# Patient Record
(demographics unavailable — no encounter records)

---

## 2024-12-19 NOTE — REASON FOR VISIT
[Initial Consultation] : an initial consultation for [Referred By: ___] : Referred By: [unfilled] [Rectal Cancer] : rectal cancer [Spouse] : spouse [FreeTextEntry2] : invasive moderately differentiated rectal adenocarcinoma

## 2024-12-19 NOTE — RESULTS/DATA
[FreeTextEntry1] : ***12/06/2024 - POLYPECTOMY PATHOLOGY*** Specimens: A. Colon, cecum, polyp B. Colon, sigmoid, polyp C. Rectal polyp Diagnoses: A. Colon, cecum, polyp, EMR - Tubular adenoma(S), multiple fragments B. Colon, sigmoid, polyp, EMR - Tubulovillous adenoma, multiple fragments C. Rectal polyp, EMR - Invasive moderately differentiated adenocarcinoma - Multiple fragments of invasive adenocarcinoma arising from a tubular adenoma with high grade dysplasia RESULTS: Mismatch repair: - Immunohistochemistry (IHC) MLH1 result: Intact - Immunohistochemistry (IHC) MLH2 result: Intact - Immunohistochemistry (IHC) MSH6 result: Intact HER2: - Positive (Score 3+)  - Percentage of Tumor Cells with Specific membrane staining: Greater than or equal to 50%   ***12/14/2024 - STAGING CT CAP*** INTERPRETATION:  - CLINICAL INFORMATION: Newly diagnosed colon cancer, staging scan - COMPARISON: None. FINDINGS: - LUNGS AND LARGE AIRWAYS: Patent central airways. No pulmonary nodules. - PLEURA: No pleural effusion. - VESSELS: Within normal limits. - HEART: Heart size is normal. No pericardial effusion. - MEDIASTINUM AND JORGE A: No lymphadenopathy. - CHEST WALL AND LOWER NECK: Within normal limits. - LIVER: Within normal limits. - BILE DUCTS: Normal caliber. - GALLBLADDER: Within normal limits. - SPLEEN: Within normal limits. - PANCREAS: Within normal limits. - ADRENALS: Within normal limits. - KIDNEYS/URETERS: A 1.3 cm ill-defined low-attenuation lesion in the right kidney is likely benign however follow-up recommended to ensure stability. No evidence for hydronephrosis. - BLADDER: Within normal limits. - REPRODUCTIVE ORGANS: Prostate within normal limits. - BOWEL: Stool-filled colon is visible. No discrete colonic lesions visible; small colonic lesions cannot be entirely excluded. No bowel obstruction. Appendix is normal. - PERITONEUM/RETROPERITONEUM: Within normal limits. - VESSELS: Within normal limits. - LYMPH NODES: No lymphadenopathy. - ABDOMINAL WALL: Within normal limits. - BONES: Within normal limits. IMPRESSION: - No evidence for metastatic disease. - A 1.3 cm ill-defined low-attenuation lesion in the right kidney is likely benign however follow-up recommended to ensure stability. --- End of Report ---

## 2024-12-19 NOTE — ASSESSMENT
[FreeTextEntry1] : Mr. Russell is a 62 y/o male with a PMHx of atrial fibrillation, presenting for surgical evaluation of an invasive and moderately differentiated adenocarcinoma of the rectum, pMMR, staging CT C/A/P with no evidence of metastases. Has not yet had rectal MRI.  Clinically, Mr. Russell's physical examination is unremarkable. We had an extensive discussion regarding the patient's newly diagnosed rectal adenocarcinoma, and the staging and treatments involved. I have recommended that he follow up with my colorectal colleague Dr. Angulo for definitive management. I will order his rectal MRI for staging.  PLAN: 1) Follow up with Dr. Angulo 2) Rectal MRI, labs including CEA to complete staging.  Canelo Ely MD  Assistant Professor of Surgery [place] [facility] [employer] [facility] Division of Surgical Oncology [facility] Phone: [phone] Fax: [phone]  I spent 60 minutes reviewing the patient's chart, labs, imaging, interviewing and examining patient, and discussing plan of care with the patient, resident/PA team, and other providers, excluding separately billable procedures and teaching time.

## 2024-12-19 NOTE — PHYSICAL EXAM
[Normal Neck Lymph Nodes] : normal neck lymph nodes  [Normal Supraclavicular Lymph Nodes] : normal supraclavicular lymph nodes [Normal Groin Lymph Nodes] : normal groin lymph nodes [Normal Axillary Lymph Nodes] : normal axillary lymph nodes [Normal] : oriented to person, place and time, with appropriate affect [de-identified] : wears glasses [de-identified] : RRR [de-identified] : easy WOB [de-identified] : Soft, non-tender non-distended.  No rebound, no guarding, no rigidity. No peritoneal signs. No masses.

## 2024-12-19 NOTE — HISTORY OF PRESENT ILLNESS
[de-identified] : Mr. Janiya Russell is a 62 y/o male with a PMHx of shuffling gait, presenting for surgical evaluation of colon cancer. Referred by family friend. Initial work up at [facility].   Patient underwent initial health surveillance colonoscopy in 08/2024, which was impressionable for colonic polyps. Patient was the scheduled for polypectomy for further work up on 10/14/2024, but this was postponed due to incidental atrial fibrillation. Once patient was treated by cardiology for now controlled a-fib, he elected to proceed with rescheduled polypectomy. During the 12/06/2024 polypectomy, 3 large polyps were excised - the 2.6x2.5x0.5cm rectal polyp was consistent with invasive moderately differentiated adenocarcinoma, arising from a tubular adenoma with high grade dysplasia. The height of the lesion on colonoscopy was not reported. Patient was lost to follow up by [facility] system and sought additional evaluations with [facility] and [facility]. Per [facility] cancer navigation, patient obtained staging CT on 12/14/2024, which demonstrated a 1.3 cm ill-defined low-attenuation lesion in the right kidney, no evidence of metastatic disease.   Today, Mr. Russell presents for initial consultation of the newly diagnosed rectal adenocarcinoma. Accompanied by his wife. He is doing well at this time. The patient reports that he has been doing well and had no preexisting medical conditions prior to this diagnosis. Denies any gastrointestinal symptoms prior to colonoscopy, stating he went for colonoscopy per routine health surveillance recommendations by family/PCP. Denies nausea, vomiting, diarrhea, abdominal/rectal pain, melena, or blood in stool/toilet/wiping.   PMHx: atrial fibrillation (Metoprolol)  FMHx: breast cancer (mother); melanoma and stroke (father) SHx: Left shoulder surgery SocHx: employed as ; ; lives in [place]

## 2025-01-03 NOTE — PHYSICAL EXAM
[Exam Deferred] : exam was deferred [Respiratory Effort] : normal respiratory effort [Normal Rate and Rhythm] : normal rate and rhythm [Calm] : calm [de-identified] : Well-appearing, in no distress [de-identified] : Soft, nontender, nondistended.  No mass or hernias appreciated [de-identified] : Normocephalic, atraumatic [de-identified] : Moves extremities without difficulty [de-identified] : Warm and dry [de-identified] : Alert and oriented x 3

## 2025-01-03 NOTE — HISTORY OF PRESENT ILLNESS
[FreeTextEntry1] : 63-year-old male presents for consultation for rectal cancer.  He underwent his first colonoscopy in August and was noted to have 5 polyps, 2 of which were removed.  Recommendation was for an advanced endoscopist to remove the remaining polyps.  This was scheduled but delayed due to new onset A-fib at the time of the colonoscopy.  He ultimately underwent endoscopic mucosal resection on December 6th at [facility] and had a 2.5 cm rectal polyp removed which returned invasive moderately differentiated adenocarcinoma arising from tubular adenoma with high-grade dysplasia.  Based on the colonoscopy report, this polyp was removed entirely.  He underwent staging workup with a CT scan of the chest abdomen pelvis which was negative for metastatic disease.  He underwent MRI of the pelvis and the report is pending. CEA level 0.9.   With regards to his new onset A-fib, he is on metoprolol but no anticoagulation.

## 2025-01-03 NOTE — PLAN
[TextEntry] : 63-year-old male with a diagnosis of rectal cancer based on a polypectomy.  The margins of the polypectomy were not assessed.  Also, the exact location of the tumor in the rectum was not mentioned on the colonoscopy report.  MRI report is pending but I do not see any obvious mucosal lesions on the MRI which is not surprising since this area was removed completely.  I recommended flexible sigmoidoscopy to identify the exact location tumor.  Depending on the final MRI report as well as location within the rectum, would determine the next course of action in terms of surgery, medical and/or radiation treatment.  Patient will be discussed at multidisciplinary rectal cancer tumor board.

## 2025-01-03 NOTE — CONSULT LETTER
[Dear  ___] : Dear  [unfilled], [Consult Letter:] : I had the pleasure of evaluating your patient, [unfilled]. [Please see my note below.] : Please see my note below. [Consult Closing:] : Thank you very much for allowing me to participate in the care of this patient.  If you have any questions, please do not hesitate to contact me. [Sincerely,] : Sincerely, [FreeTextEntry3] : Zachariah Pedraza MD

## 2025-01-05 NOTE — REASON FOR VISIT
[Arrhythmia/ECG Abnorrmalities] : arrhythmia/ECG abnormalities [FreeTextEntry3] : Dr Adams [FreeTextEntry1] : incomplete note

## 2025-01-05 NOTE — HISTORY OF PRESENT ILLNESS
[FreeTextEntry1] : 63 year old gentleman with recently diagnosed rectal cancer, presenting for evaluation of persistent AF.   He as recently noted to have AF during evaluation for colonoscopy.  He has remained in AF with elevated rates, and has been generally asymptomatic.   A 3 day monitor from 10/21- 10/24/24 revealed persistent AF  with average  bpm (range  bpm).  TTE in the setting of AF revealed normal LV function.  Metoprolol was increased to 100mg bid due to rapid rates. He has not yet been started on anticoagulation.   He did ultimately undergo colonoscopy with removal of several polyps, with pathology positive for invasive moderately differentiated adenocarcinoma arising from tubular adenoma with high-grade dysplasia. He underwent staging workup with a CT scan of the chest abdomen pelvis which was negative for metastatic disease. He is planned for MRI and flex sigmoidoscopy for further evaluation, prior to consideration of possible surgery vs chemo/XRT.    Current meds include Toprol.

## 2025-02-27 NOTE — PHYSICAL EXAM

## 2025-02-27 NOTE — REASON FOR VISIT
[Symptom and Test Evaluation] : symptom and test evaluation [FreeTextEntry1] : 63-year-old man referred by Dr. Adams for evaluation of atrial fibrillation

## 2025-02-27 NOTE — DISCUSSION/SUMMARY
[EKG obtained to assist in diagnosis and management of assessed problem(s)] : EKG obtained to assist in diagnosis and management of assessed problem(s) [FreeTextEntry1] :  - We discussed the pathophysiology of atrial fibrillation and multiple management options. These include rate Vs rhythm control strategies. - Given the associated symptoms despite attempts at a rate control strategy/ reduced EF, I would recommend that efforts be directed to restoring and maintaining sinus rhythm. -For rhythm control, options include catheter ablation or medications, each with its own benefits and risks, which were reviewed in detail.   - Options include: a) antiarrhythmic medication b) ablation of atrial fibrillation   - We reviewed in detail the potential risks and benefits of ablative therapy for atrial fibrillation (including pulmonary vein isolation). - For persistent atrial fibrillation, the success rate of a single procedure for achieving durable sinus rhythm is 40-50%, and some patients have a better response to antiarrhythmic therapy after ablation. Success rates are often higher after a second or even third procedure. - The overall risk of any complications, including minor issues like groin hematoma not requiring intervention, is 2-3%. Serious complications like vascular injury requiring intervention are less frequent, and life-changing or potentially life-threatening complications such as cardiac perforation and tamponade, phrenic nerve or esophageal injury, stroke, or heart attack occur in less than 1% of patients. The use of newer pulsed-field ablation (PFA) technology likely reduces the risk of phrenic or esophageal injury even further, with shorter procedure times on average. - The overall risk is 2-3%, including the same risks as for flutter ablation, but a slightly higher risk of stroke and cardiac perforation and a small additional risk of serious injury such as phrenic nerve palsy or atrio-esophageal fistula.   After extensive discussion, the patient would like to proceed with an ablation. We therefore agreed on the following plan: We will book him for a RICKI cardioversion as soon as available with plan to then discharge him on an antiarrhythmic drug.  This could either be a class Ic agent, dronedarone or amiodarone  -We will then book him for atrial fibrillation ablation 3 to 4 weeks after

## 2025-02-27 NOTE — REVIEW OF SYSTEMS
[Negative] : Heme/Lymph [SOB] : no shortness of breath [Dyspnea on exertion] : dyspnea during exertion [Syncope] : no syncope

## 2025-02-27 NOTE — PHYSICAL EXAM

## 2025-02-27 NOTE — HISTORY OF PRESENT ILLNESS
[FreeTextEntry1] : 63 year old gentleman with recently diagnosed rectal cancer, presenting for evaluation of persistent AF.  He as recently noted to have AF during evaluation for colonoscopy. He has remained in AF with elevated rates, and has been generally asymptomatic.  A 3 day monitor from 10/21- 10/24/24 revealed persistent AF with average  bpm (range  bpm). TTE in the setting of AF revealed normal LV function. Metoprolol was increased to 100mg bid due to rapid rates. He has not yet been started on anticoagulation.  He did ultimately undergo colonoscopy with removal of several polyps, with pathology positive for invasive moderately differentiated adenocarcinoma arising from tubular adenoma with high-grade dysplasia. He underwent staging workup with a CT scan of the chest abdomen pelvis which was negative for metastatic disease. He is planned for MRI and flex sigmoidoscopy for further evaluation, prior to consideration of possible surgery vs chemo/XRT.  Current meds include Toprol And Eliquis started by Dr. Adams 12/25. ECG at that time demonstrated AF.  He is telling me he does not have palpitations, but has noticed some reduced energy and possibly some STEVENSON C2V=0 vitals are hr 84 bp 130/68 W238 H6'4